# Patient Record
Sex: FEMALE | Race: WHITE | Employment: UNEMPLOYED | URBAN - NONMETROPOLITAN AREA
[De-identification: names, ages, dates, MRNs, and addresses within clinical notes are randomized per-mention and may not be internally consistent; named-entity substitution may affect disease eponyms.]

---

## 2018-08-18 ENCOUNTER — HOSPITAL ENCOUNTER (INPATIENT)
Facility: OTHER | Age: 63
LOS: 3 days | Discharge: HOME | DRG: 809 | End: 2018-08-21
Attending: FAMILY MEDICINE | Admitting: INTERNAL MEDICINE
Payer: COMMERCIAL

## 2018-08-18 ENCOUNTER — APPOINTMENT (OUTPATIENT)
Dept: GENERAL RADIOLOGY | Facility: OTHER | Age: 63
DRG: 809 | End: 2018-08-18
Attending: FAMILY MEDICINE
Payer: COMMERCIAL

## 2018-08-18 DIAGNOSIS — C50.919 METASTASIS FROM BREAST CANCER (H): ICD-10-CM

## 2018-08-18 DIAGNOSIS — J90 PLEURAL EFFUSION: ICD-10-CM

## 2018-08-18 DIAGNOSIS — C79.9 METASTASIS FROM BREAST CANCER (H): ICD-10-CM

## 2018-08-18 DIAGNOSIS — R50.9 FEBRILE ILLNESS, ACUTE: ICD-10-CM

## 2018-08-18 LAB
ALBUMIN SERPL-MCNC: 3.7 G/DL (ref 3.5–5.7)
ALBUMIN UR-MCNC: NEGATIVE MG/DL
ALP SERPL-CCNC: 87 U/L (ref 34–104)
ALT SERPL W P-5'-P-CCNC: 24 U/L (ref 7–52)
ANION GAP SERPL CALCULATED.3IONS-SCNC: 7 MMOL/L (ref 3–14)
APPEARANCE UR: CLEAR
AST SERPL W P-5'-P-CCNC: 39 U/L (ref 13–39)
BASOPHILS # BLD AUTO: 0 10E9/L (ref 0–0.2)
BASOPHILS NFR BLD AUTO: 1 %
BILIRUB SERPL-MCNC: 0.4 MG/DL (ref 0.3–1)
BILIRUB UR QL STRIP: NEGATIVE
BUN SERPL-MCNC: 7 MG/DL (ref 7–25)
CALCIUM SERPL-MCNC: 8.9 MG/DL (ref 8.6–10.3)
CHLORIDE SERPL-SCNC: 101 MMOL/L (ref 98–107)
CO2 SERPL-SCNC: 26 MMOL/L (ref 21–31)
COLOR UR AUTO: YELLOW
CREAT SERPL-MCNC: 0.61 MG/DL (ref 0.6–1.2)
CRP SERPL-MCNC: 1.9 MG/L
DIFFERENTIAL METHOD BLD: ABNORMAL
DIFFERENTIAL METHOD BLD: NORMAL
ERYTHROCYTE [DISTWIDTH] IN BLOOD BY AUTOMATED COUNT: 13.6 % (ref 10–15)
ERYTHROCYTE [DISTWIDTH] IN BLOOD BY AUTOMATED COUNT: NORMAL % (ref 10–15)
GFR SERPL CREATININE-BSD FRML MDRD: >90 ML/MIN/1.7M2
GLUCOSE SERPL-MCNC: 98 MG/DL (ref 70–105)
GLUCOSE UR STRIP-MCNC: NEGATIVE MG/DL
HCT VFR BLD AUTO: 34.5 % (ref 35–47)
HCT VFR BLD AUTO: NORMAL % (ref 35–47)
HGB BLD-MCNC: 12.5 G/DL (ref 11.7–15.7)
HGB BLD-MCNC: NORMAL G/DL (ref 11.7–15.7)
HGB UR QL STRIP: NEGATIVE
KETONES UR STRIP-MCNC: NEGATIVE MG/DL
LACTATE SERPL-SCNC: 0.9 MMOL/L (ref 0.5–2.2)
LEUKOCYTE ESTERASE UR QL STRIP: NEGATIVE
LYMPHOCYTES # BLD AUTO: 0.5 10E9/L (ref 0.8–5.3)
LYMPHOCYTES NFR BLD AUTO: 36 %
MCH RBC QN AUTO: 34.9 PG (ref 26.5–33)
MCH RBC QN AUTO: NORMAL PG (ref 26.5–33)
MCHC RBC AUTO-ENTMCNC: 36.2 G/DL (ref 31.5–36.5)
MCHC RBC AUTO-ENTMCNC: NORMAL G/DL (ref 31.5–36.5)
MCV RBC AUTO: 96 FL (ref 78–100)
MCV RBC AUTO: NORMAL FL (ref 78–100)
MONOCYTES # BLD AUTO: 0.2 10E9/L (ref 0–1.3)
MONOCYTES NFR BLD AUTO: 11 %
NEUTROPHILS # BLD AUTO: 0.7 10E9/L (ref 1.6–8.3)
NEUTROPHILS NFR BLD AUTO: 52 %
NITRATE UR QL: NEGATIVE
NRBC # BLD AUTO: 0 10*3/UL
NRBC BLD AUTO-RTO: 1 /100
PH UR STRIP: 5.5 PH (ref 5–9)
PLATELET # BLD AUTO: 148 10E9/L (ref 150–450)
PLATELET # BLD AUTO: NORMAL 10E9/L (ref 150–450)
POTASSIUM SERPL-SCNC: 3.5 MMOL/L (ref 3.5–5.1)
PROCALCITONIN SERPL-MCNC: 0.5 NG/ML
PROT SERPL-MCNC: 6.7 G/DL (ref 6.4–8.9)
RBC # BLD AUTO: 3.58 10E12/L (ref 3.8–5.2)
RBC # BLD AUTO: NORMAL 10E12/L (ref 3.8–5.2)
SODIUM SERPL-SCNC: 134 MMOL/L (ref 134–144)
SOURCE: NORMAL
SP GR UR STRIP: <1.005 (ref 1–1.03)
UROBILINOGEN UR STRIP-ACNC: 0.2 EU/DL (ref 0.2–1)
WBC # BLD AUTO: 1.4 10E9/L (ref 4–11)
WBC # BLD AUTO: NORMAL 10E9/L (ref 4–11)

## 2018-08-18 PROCEDURE — 85025 COMPLETE CBC W/AUTO DIFF WBC: CPT | Performed by: FAMILY MEDICINE

## 2018-08-18 PROCEDURE — 96365 THER/PROPH/DIAG IV INF INIT: CPT | Performed by: FAMILY MEDICINE

## 2018-08-18 PROCEDURE — 80053 COMPREHEN METABOLIC PANEL: CPT | Performed by: FAMILY MEDICINE

## 2018-08-18 PROCEDURE — 99285 EMERGENCY DEPT VISIT HI MDM: CPT | Mod: Z6 | Performed by: FAMILY MEDICINE

## 2018-08-18 PROCEDURE — 71046 X-RAY EXAM CHEST 2 VIEWS: CPT

## 2018-08-18 PROCEDURE — 87040 BLOOD CULTURE FOR BACTERIA: CPT | Performed by: FAMILY MEDICINE

## 2018-08-18 PROCEDURE — 86140 C-REACTIVE PROTEIN: CPT | Performed by: FAMILY MEDICINE

## 2018-08-18 PROCEDURE — 83605 ASSAY OF LACTIC ACID: CPT | Performed by: FAMILY MEDICINE

## 2018-08-18 PROCEDURE — 36415 COLL VENOUS BLD VENIPUNCTURE: CPT | Performed by: FAMILY MEDICINE

## 2018-08-18 PROCEDURE — 84145 PROCALCITONIN (PCT): CPT | Performed by: FAMILY MEDICINE

## 2018-08-18 PROCEDURE — 99285 EMERGENCY DEPT VISIT HI MDM: CPT | Mod: 25 | Performed by: FAMILY MEDICINE

## 2018-08-18 PROCEDURE — 25000128 H RX IP 250 OP 636: Performed by: FAMILY MEDICINE

## 2018-08-18 PROCEDURE — 81003 URINALYSIS AUTO W/O SCOPE: CPT | Performed by: FAMILY MEDICINE

## 2018-08-18 PROCEDURE — 87040 BLOOD CULTURE FOR BACTERIA: CPT | Mod: 91 | Performed by: FAMILY MEDICINE

## 2018-08-18 PROCEDURE — 12000000 ZZH R&B MED SURG/OB

## 2018-08-18 PROCEDURE — 96361 HYDRATE IV INFUSION ADD-ON: CPT | Performed by: FAMILY MEDICINE

## 2018-08-18 RX ORDER — CEFEPIME HYDROCHLORIDE 2 G/1
2 INJECTION, POWDER, FOR SOLUTION INTRAVENOUS ONCE
Status: COMPLETED | OUTPATIENT
Start: 2018-08-18 | End: 2018-08-18

## 2018-08-18 RX ORDER — CEFEPIME HYDROCHLORIDE 2 G/1
2 INJECTION, POWDER, FOR SOLUTION INTRAVENOUS EVERY 8 HOURS
Status: DISCONTINUED | OUTPATIENT
Start: 2018-08-18 | End: 2018-08-19 | Stop reason: CLARIF

## 2018-08-18 RX ORDER — NALOXONE HYDROCHLORIDE 0.4 MG/ML
.1-.4 INJECTION, SOLUTION INTRAMUSCULAR; INTRAVENOUS; SUBCUTANEOUS
Status: DISCONTINUED | OUTPATIENT
Start: 2018-08-18 | End: 2018-08-21 | Stop reason: HOSPADM

## 2018-08-18 RX ORDER — EXEMESTANE 25 MG/1
25 TABLET ORAL DAILY
COMMUNITY

## 2018-08-18 RX ORDER — HYDROCODONE BITARTRATE AND ACETAMINOPHEN 5; 325 MG/1; MG/1
1-2 TABLET ORAL EVERY 4 HOURS PRN
Status: DISCONTINUED | OUTPATIENT
Start: 2018-08-18 | End: 2018-08-21 | Stop reason: HOSPADM

## 2018-08-18 RX ORDER — SODIUM CHLORIDE, SODIUM LACTATE, POTASSIUM CHLORIDE, CALCIUM CHLORIDE 600; 310; 30; 20 MG/100ML; MG/100ML; MG/100ML; MG/100ML
1000 INJECTION, SOLUTION INTRAVENOUS CONTINUOUS
Status: DISCONTINUED | OUTPATIENT
Start: 2018-08-18 | End: 2018-08-19

## 2018-08-18 RX ORDER — ACETAMINOPHEN 325 MG/1
650 TABLET ORAL EVERY 4 HOURS PRN
Status: DISCONTINUED | OUTPATIENT
Start: 2018-08-18 | End: 2018-08-21 | Stop reason: HOSPADM

## 2018-08-18 RX ORDER — ONDANSETRON 4 MG/1
4 TABLET, ORALLY DISINTEGRATING ORAL EVERY 6 HOURS PRN
Status: DISCONTINUED | OUTPATIENT
Start: 2018-08-18 | End: 2018-08-21 | Stop reason: HOSPADM

## 2018-08-18 RX ORDER — ONDANSETRON 2 MG/ML
4 INJECTION INTRAMUSCULAR; INTRAVENOUS EVERY 6 HOURS PRN
Status: DISCONTINUED | OUTPATIENT
Start: 2018-08-18 | End: 2018-08-21 | Stop reason: HOSPADM

## 2018-08-18 RX ADMIN — SODIUM CHLORIDE, SODIUM LACTATE, POTASSIUM CHLORIDE, AND CALCIUM CHLORIDE 1000 ML: 600; 310; 30; 20 INJECTION, SOLUTION INTRAVENOUS at 20:51

## 2018-08-18 RX ADMIN — CEFEPIME HYDROCHLORIDE 2 G: 2 INJECTION, POWDER, FOR SOLUTION INTRAVENOUS at 21:25

## 2018-08-18 NOTE — IP AVS SNAPSHOT
[facility]    [address]    [place] [address]    Phone:  [phone]    Fax:  [phone]                                       After Visit Summary   8/18/2018    Barb Valdez    MRN: [id number]           After Visit Summary Signature Page     I have received my discharge instructions, and my questions have been answered. I have discussed any challenges I see with this plan with the nurse or doctor.    ..........................................................................................................................................  Patient/Patient Representative Signature      ..........................................................................................................................................  Patient Representative Print Name and Relationship to Patient    ..................................................               ................................................  Date                                            Time    ..........................................................................................................................................  Reviewed by Signature/Title    ...................................................              ..............................................  Date                                                            Time

## 2018-08-18 NOTE — IP AVS SNAPSHOT
MRN:[id number]                      After Visit Summary   8/18/2018    Barb Valdez    MRN: [id number]           Thank you!     Thank you for choosing [facility] for your care. Our goal is always to provide you with excellent care. Hearing back from our patients is one way we can continue to improve our services. Please take a few minutes to complete the written survey that you may receive in the mail after you visit with us. Thank you!        Patient Information     Date Of Birth          1955        Designated Caregiver       Most Recent Value    Caregiver    Will someone help with your care after discharge? no      About your hospital stay     You were admitted on:  August 18, 2018 You last received care in the:  [facility]    You were discharged on:  August 21, 2018        Reason for your hospital stay       Fever with low neutrophils (white cells) in your blood.                  Who to Call     For medical emergencies, please call 911.  For non-urgent questions about your medical care, please call your primary care provider or clinic, None          Attending Provider     Provider Specialty    Catrachito Lilly MD Specialist    Sander Wren MD Internal Medicine       Primary Care Provider Fax #    Physician No Ref-Primary [phone]      After Care Instructions     Activity       Your activity upon discharge: activity as tolerated            Diet       Follow this diet upon discharge: Orders Placed This Encounter      Regular Diet Adult                  Follow-up Appointments     Follow-up and recommended labs and tests        Follow up with your oncologist in [place] on 8/24/2018  for hospital follow- up. The following labs/tests are recommended: CBC with diff. Follow up of stool culture done in-house.                  Pending Results     Date and Time Order Name Status Description    8/19/2018 1035 Stool culture SSCE Preliminary     8/18/2018 2020 Blood  "culture Preliminary     2018 Blood culture Preliminary             Statement of Approval     Ordered          18 1012  I have reviewed and agree with all the recommendations and orders detailed in this document.  EFFECTIVE NOW     Approved and electronically signed by:  Sander Wren MD             Admission Information     Date & Time Provider Department Dept. Phone    2018 Sander Wren MD [facility] [phone]      Your Vitals Were     Blood Pressure Pulse Temperature Respirations Height Weight    111/63 69 97.6  F (36.4  C) (Tympanic) 16 1.626 m (5' 4\") 57.4 kg (126 lb 9.6 oz)    Pulse Oximetry BMI (Body Mass Index)                95% 21.73 kg/m2          MyChart Information     [employer] lets you send messages to your doctor, view your test results, renew your prescriptions, schedule appointments and more. To sign up, go to [email or web address] . Click on \"Log in\" on the left side of the screen, which will take you to the Welcome page. Then click on \"Sign up Now\" on the right side of the page.     You will be asked to enter the access code listed below, as well as some personal information. Please follow the directions to create your username and password.     Your access code is: [email or web address]  Expires: 2018 10:22 AM     Your access code will  in 90 days. If you need help or a new code, please call your [facility] clinic or [phone].        Care EveryWhere ID     This is your Care EveryWhere ID. This could be used by other organizations to access your [facility] medical records  [id number]        Equal Access to Services     [facility] AH: Hadii torey Wang, waorlando bar, qaybron colindres. So [facility] [phone].    ATENCIÓN: Si habla español, tiene a ly disposición servicios gratuitos de asistencia lingüística. Llame al [phone].    We comply with applicable federal civil rights " laws and [place] laws. We do not discriminate on the basis of race, color, national origin, age, disability, sex, sexual orientation, or gender identity.               Review of your medicines      CONTINUE these medicines which have NOT CHANGED        Dose / Directions    exemestane 25 MG tablet   Commonly known as:  AROMASIN        Dose:  25 mg   Take 25 mg by mouth daily   Refills:  0       Multi-vitamin Tabs tablet        Dose:  1 tablet   Take 1 tablet by mouth daily   Refills:  0       PROBIOTIC ADVANCED PO        Dose:  1 capsule   Take 1 capsule by mouth daily [employer]   Refills:  0       Vitamin D (Cholecalciferol) 1000 units Tabs        Dose:  5000 Units   Take 5,000 Units by mouth daily   Refills:  0         STOP taking     palbociclib 100 MG capsule CHEMO   Commonly known as:  IBRANCE                    Protect others around you: Learn how to safely use, store and throw away your medicines at www.disposemymeds.org.             Medication List: This is a list of all your medications and when to take them. Check marks below indicate your daily home schedule. Keep this list as a reference.      Medications           Morning Afternoon Evening Bedtime As Needed    exemestane 25 MG tablet   Commonly known as:  AROMASIN   Take 25 mg by mouth daily   Last time this was given:  25 mg on 8/21/2018 10:00 AM                                Multi-vitamin Tabs tablet   Take 1 tablet by mouth daily   Last time this was given:  1 tablet on 8/20/2018 11:31 AM                                PROBIOTIC ADVANCED PO   Take 1 capsule by mouth daily Geni                                Vitamin D (Cholecalciferol) 1000 units Tabs   Take 5,000 Units by mouth daily   Last time this was given:  5,000 Units on 8/20/2018 11:31 AM

## 2018-08-19 LAB
ANION GAP SERPL CALCULATED.3IONS-SCNC: 5 MMOL/L (ref 3–14)
BASOPHILS # BLD AUTO: 0 10E9/L (ref 0–0.2)
BASOPHILS NFR BLD AUTO: 0 %
BUN SERPL-MCNC: 7 MG/DL (ref 7–25)
C DIFF TOX B STL QL: NEGATIVE
CALCIUM SERPL-MCNC: 8.7 MG/DL (ref 8.6–10.3)
CHLORIDE SERPL-SCNC: 107 MMOL/L (ref 98–107)
CO2 SERPL-SCNC: 24 MMOL/L (ref 21–31)
CREAT SERPL-MCNC: 0.58 MG/DL (ref 0.6–1.2)
DIFFERENTIAL METHOD BLD: ABNORMAL
EOSINOPHIL # BLD AUTO: 0 10E9/L (ref 0–0.7)
EOSINOPHIL NFR BLD AUTO: 0 %
ERYTHROCYTE [DISTWIDTH] IN BLOOD BY AUTOMATED COUNT: 13.7 % (ref 10–15)
GFR SERPL CREATININE-BSD FRML MDRD: >90 ML/MIN/1.7M2
GLUCOSE SERPL-MCNC: 88 MG/DL (ref 70–105)
HCT VFR BLD AUTO: 32 % (ref 35–47)
HGB BLD-MCNC: 11.7 G/DL (ref 11.7–15.7)
LYMPHOCYTES # BLD AUTO: 0.7 10E9/L (ref 0.8–5.3)
LYMPHOCYTES NFR BLD AUTO: 42 %
MCH RBC QN AUTO: 35.1 PG (ref 26.5–33)
MCHC RBC AUTO-ENTMCNC: 36.6 G/DL (ref 31.5–36.5)
MCV RBC AUTO: 96 FL (ref 78–100)
MONOCYTES # BLD AUTO: 0.2 10E9/L (ref 0–1.3)
MONOCYTES NFR BLD AUTO: 14 %
NEUTROPHILS # BLD AUTO: 0.4 10E9/L (ref 1.6–8.3)
NEUTROPHILS NFR BLD AUTO: 28 %
NEUTS BAND # BLD AUTO: 0.3 10E9/L (ref 0–0.6)
NEUTS BAND NFR BLD MANUAL: 16 %
PLATELET # BLD AUTO: 114 10E9/L (ref 150–450)
POTASSIUM SERPL-SCNC: 3.6 MMOL/L (ref 3.5–5.1)
RBC # BLD AUTO: 3.33 10E12/L (ref 3.8–5.2)
SODIUM SERPL-SCNC: 136 MMOL/L (ref 134–144)
SPECIMEN SOURCE: NORMAL
VARIANT LYMPHS BLD QL SMEAR: PRESENT
WBC # BLD AUTO: 1.6 10E9/L (ref 4–11)

## 2018-08-19 PROCEDURE — 25000132 ZZH RX MED GY IP 250 OP 250 PS 637: Performed by: INTERNAL MEDICINE

## 2018-08-19 PROCEDURE — 36415 COLL VENOUS BLD VENIPUNCTURE: CPT | Mod: 91 | Performed by: FAMILY MEDICINE

## 2018-08-19 PROCEDURE — 87493 C DIFF AMPLIFIED PROBE: CPT | Performed by: INTERNAL MEDICINE

## 2018-08-19 PROCEDURE — 12000000 ZZH R&B MED SURG/OB

## 2018-08-19 PROCEDURE — 80048 BASIC METABOLIC PNL TOTAL CA: CPT | Performed by: FAMILY MEDICINE

## 2018-08-19 PROCEDURE — 87046 STOOL CULTR AEROBIC BACT EA: CPT | Performed by: INTERNAL MEDICINE

## 2018-08-19 PROCEDURE — 36415 COLL VENOUS BLD VENIPUNCTURE: CPT | Performed by: FAMILY MEDICINE

## 2018-08-19 PROCEDURE — 87899 AGENT NOS ASSAY W/OPTIC: CPT | Performed by: INTERNAL MEDICINE

## 2018-08-19 PROCEDURE — 25000128 H RX IP 250 OP 636: Performed by: FAMILY MEDICINE

## 2018-08-19 PROCEDURE — 25000128 H RX IP 250 OP 636: Performed by: INTERNAL MEDICINE

## 2018-08-19 PROCEDURE — 25000132 ZZH RX MED GY IP 250 OP 250 PS 637: Performed by: FAMILY MEDICINE

## 2018-08-19 PROCEDURE — 87045 FECES CULTURE AEROBIC BACT: CPT | Performed by: INTERNAL MEDICINE

## 2018-08-19 PROCEDURE — 99223 1ST HOSP IP/OBS HIGH 75: CPT | Performed by: INTERNAL MEDICINE

## 2018-08-19 PROCEDURE — 85025 COMPLETE CBC W/AUTO DIFF WBC: CPT | Performed by: FAMILY MEDICINE

## 2018-08-19 PROCEDURE — 85025 COMPLETE CBC W/AUTO DIFF WBC: CPT | Mod: 91 | Performed by: FAMILY MEDICINE

## 2018-08-19 RX ORDER — LACTOBACILLUS ACIDOPH-L.BULGARICUS 1 MILLION CELL CHEWABLE TABLET 1MM CELL
1 TABLET,CHEWABLE ORAL
Status: DISCONTINUED | OUTPATIENT
Start: 2018-08-19 | End: 2018-08-21 | Stop reason: HOSPADM

## 2018-08-19 RX ORDER — MULTIPLE VITAMINS W/ MINERALS TAB 9MG-400MCG
1 TAB ORAL
Status: DISCONTINUED | OUTPATIENT
Start: 2018-08-19 | End: 2018-08-21 | Stop reason: HOSPADM

## 2018-08-19 RX ORDER — MULTIPLE VITAMINS W/ MINERALS TAB 9MG-400MCG
1 TAB ORAL DAILY
COMMUNITY

## 2018-08-19 RX ORDER — MULTIVIT-MIN/IRON/FOLIC ACID/K 18-600-40
5000 CAPSULE ORAL DAILY
COMMUNITY

## 2018-08-19 RX ORDER — CEFEPIME 1 G/50ML
2 INJECTION, SOLUTION INTRAVENOUS EVERY 8 HOURS
Status: DISCONTINUED | OUTPATIENT
Start: 2018-08-19 | End: 2018-08-19

## 2018-08-19 RX ORDER — EXEMESTANE 25 MG/1
25 TABLET ORAL DAILY
Status: DISCONTINUED | OUTPATIENT
Start: 2018-08-19 | End: 2018-08-19

## 2018-08-19 RX ORDER — SODIUM CHLORIDE 9 MG/ML
INJECTION, SOLUTION INTRAVENOUS CONTINUOUS
Status: DISCONTINUED | OUTPATIENT
Start: 2018-08-19 | End: 2018-08-20

## 2018-08-19 RX ORDER — LACTOBACILLUS ACIDOPH-L.BULGARICUS 1 MILLION CELL CHEWABLE TABLET 1MM CELL
1 TABLET,CHEWABLE ORAL
Status: DISCONTINUED | OUTPATIENT
Start: 2018-08-19 | End: 2018-08-19 | Stop reason: CLARIF

## 2018-08-19 RX ADMIN — CEFEPIME HYDROCHLORIDE 2 G: 2 INJECTION, POWDER, FOR SOLUTION INTRAVENOUS at 05:50

## 2018-08-19 RX ADMIN — CEFEPIME HYDROCHLORIDE 2 G: 2 INJECTION, POWDER, FOR SOLUTION INTRAVENOUS at 21:07

## 2018-08-19 RX ADMIN — SODIUM CHLORIDE: 900 INJECTION, SOLUTION INTRAVENOUS at 01:04

## 2018-08-19 RX ADMIN — LACTOBACILLUS ACIDOPH-L.BULGARICUS 1 MILLION CELL CHEWABLE TABLET 1 TABLET: at 13:40

## 2018-08-19 RX ADMIN — LACTOBACILLUS ACIDOPH-L.BULGARICUS 1 MILLION CELL CHEWABLE TABLET 1 TABLET: at 16:50

## 2018-08-19 RX ADMIN — FILGRASTIM 300 MCG: 300 INJECTION, SOLUTION INTRAVENOUS; SUBCUTANEOUS at 09:22

## 2018-08-19 RX ADMIN — VITAMIN D 5000 UNITS: 25 TAB ORAL at 13:41

## 2018-08-19 RX ADMIN — CEFEPIME HYDROCHLORIDE 2 G: 2 INJECTION, POWDER, FOR SOLUTION INTRAVENOUS at 13:47

## 2018-08-19 RX ADMIN — MULTIPLE VITAMINS W/ MINERALS TAB 1 TABLET: TAB at 13:41

## 2018-08-19 RX ADMIN — SODIUM CHLORIDE: 900 INJECTION, SOLUTION INTRAVENOUS at 19:43

## 2018-08-19 RX ADMIN — ACETAMINOPHEN 650 MG: 325 TABLET, FILM COATED ORAL at 16:26

## 2018-08-19 ASSESSMENT — ACTIVITIES OF DAILY LIVING (ADL)
ADLS_ACUITY_SCORE: 9
ADLS_ACUITY_SCORE: 11

## 2018-08-19 NOTE — H&P
"[facility]    History and Physical  Hospitalist       Date of Admission:  8/18/2018  Date of Service (when I saw the patient): 08/19/18    Assessment & Plan   Barb Valdez is a 63 year old female who presents with febrile neutropenia.    1. Febrile neutropenia- . IV Cefepime. Source likely viral GE. Blood cultures need to ne negative for 48 hours. Given one dose of Neupogen. Follow counts and symptoms. ANC >500 x 2 days and no more antibiotics provided all cultures negative.   2. Metastatic breast CA- Follow up in [place]. Would feel more comfortable holding even hormonal therapy though that can be resumed in theory. No pain.     Active Problems:    Febrile illness, acute    DVT Prophylaxis: Enoxaparin (Lovenox) SQ  Code Status: Full Code    Disposition: Expected discharge in 2-3 days once counts fine, Cxs negative and pt doing okay.    Sander Wren    Primary Care Physician   Physician No Ref-Primary    Chief Complaint   Fever    History is obtained from the patient, electronic health record and emergency department physician    History of Present Illness   Per ED note: \"Barb Valdez is a 63 year old female who presents to emergency room with her  because of a illness that began a couple of days ago.  She feels as though she has \" picked up some type of  Bug\",  but yesterday was down in bed and in fact  hais spent about 24 hours in that fashion which is unusual for her as she is relatively active lady.  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  The patient had a recurrence about 1-1/2 years ago with metastasis determined and oncology has been following her for which she is currently on 2 medications for the management of her breast cancer, and they currently include, Aromasin and Ibrance.  However " "because of having a low white blood count she has held 1 of these medications, Ibrance,, but continues the other on a daily fashion.  The patient has had complications from the metastatic disease, pleural effusions, and this required numerous thoracenteses but has not had one for several months now.  The pleural effusion is predominantly on the left side but the patient readily states that her chest x-ray looks very ominous.  The patient's most recent scan , a PET scan was determining that there was \"widespread osseous metastatic and persistent moderate volume changes with loculated left pleural effusion and left hemithorax pleural thickening but there was no overtly new or progressive signs of malignancy.  And that study was done in July of this year.  Patient advised until she developed the metastatic breast cancer that she had been reasonably good health. However had been having problems ,  like gastroesophageal reflux.  Patient however has had some general nausea and just not feeling very well in general\"    When I saw her she added that she's had 2 days of crampy abdominal pain and today has a low volume loose watery stool. She was recently at a pot luck and has been using public toilets in airplanes during her recent travels in the last 2 weeks. These could be potentially where she picked the virus up. No nausea or emesis. A chronic dry cough that is unchanged from her stable pleural effusion. No rashes, lesions, headaches, insect bites or any any other symptoms.     Past Medical History    Metastaic breast cancer    Past Surgical History   Past surgical history reviewed with no previous surgeries identified.    Prior to Admission Medications   Prior to Admission Medications   Prescriptions Last Dose Informant Patient Reported? Taking?   exemestane (AROMASIN) 25 MG tablet 8/18/2018 at Unknown time  Yes Yes   Sig: Take 25 mg by mouth daily   palbociclib (IBRANCE) 100 MG capsule CHEMO 7/29/2018  Yes Yes   Sig: " Take 100 mg by mouth daily with food      Facility-Administered Medications: None     Allergies   No Known Allergies    Social History   I have personally reviewed the social history with the patient showing no smoking or alcohol.    Family History   Family history reviewed with patient and is noncontributory.    Review of Systems   The 10 point Review of Systems is negative other than noted in the HPI or here.     Physical Exam   Temp: 100.2  F (37.9  C) Temp src: Tympanic BP: 113/73 Pulse: 84 Heart Rate: 81 Resp: 16 SpO2: 95 % O2 Device: None (Room air)    Vital Signs with Ranges  Temp:  [98.8  F (37.1  C)-101.3  F (38.5  C)] 100.2  F (37.9  C)  Pulse:  [84-91] 84  Heart Rate:  [81-91] 81  Resp:  [14-16] 16  BP: (107-128)/(63-86) 113/73  SpO2:  [95 %-98 %] 95 %  123 lbs 0 oz    Constitutional: In NAD  Eyes: Unremarkable.  HEENT: Dry MM  Respiratory: Left sided dimnished BS at base. R lung clear  Cardiovascular: S1 + S2.  GI: Non-tender. Hyperactive BS.   Skin: No acute rashes/lesions.   Musculoskeletal: No pitting edema.   Neurologic: Grossly non-focal.  Psychiatric: Affect normal.     Data   Data reviewed today:  I personally reviewed all data below:    Recent Labs  Lab 08/19/18  0545 08/18/18 2051 08/18/18 2021   WBC 1.6* 1.4* Canceled, Test credited   HGB 11.7 12.5 Canceled, Test credited   MCV 96 96 Canceled, Test credited   * 148* Canceled, Test credited     --  134   POTASSIUM 3.6  --  3.5   CHLORIDE 107  --  101   CO2 24  --  26   BUN 7  --  7   CR 0.58*  --  0.61   ANIONGAP 5  --  7   SUSANA 8.7  --  8.9   GLC 88  --  98   ALBUMIN  --   --  3.7   PROTTOTAL  --   --  6.7   BILITOTAL  --   --  0.4   ALKPHOS  --   --  87   ALT  --   --  24   AST  --   --  39       Lactic Acid   Date Value Ref Range Status   08/18/2018 0.9 0.5 - 2.2 mmol/L Final       Recent Results (from the past 24 hour(s))   XR Chest 2 Views    Narrative    PROCEDURE:  XR CHEST 2 VW    HISTORY:  Fever; .     COMPARISON:   None.    FINDINGS:   The cardiac silhouette is normal in size. The pulmonary vasculature is  normal.  There is some increased density at the left lung base most  likely representing compressive atelectasis. There is a moderate size  left-sided pleural effusion. Widespread osteoblastic metastatic  disease is seen throughout the regional skeleton      Impression    IMPRESSION:    1. Widespread osteoblastic metastatic disease.  2. Left-sided pleural effusion.  3. Probable compressive atelectasis left lung base      SIL CHAPARRO MD

## 2018-08-19 NOTE — PROGRESS NOTES
"[facility] ADMISSION NOTE    Patient admitted to room 312 at approximately 2250 via cart from emergency room. Patient was accompanied by spouse and transport tech.     Verbal SBAR report received from LAUREN Soto prior to patient arrival.     Patient ambulated to bed with stand-by assist. Patient alert and oriented X 3. The patient is not having any pain. 0-10 Pain Scale: 0. Admission vital signs: Blood pressure 128/76, pulse 85, temperature 101.3  F (38.5  C), temperature source Tympanic, resp. rate 14, height 1.626 m (5' 4\"), weight 55.8 kg (123 lb), SpO2 98 %. Patient and spouse were oriented to plan of care, call light, bed controls, tv, telephone, bathroom and visiting hours.       "

## 2018-08-19 NOTE — ED TRIAGE NOTES
Patient states that she has stage 4 breast cancer which she is on oral chemotherapy for which she last took on 7/29. She has had a fever for 24 hours. States she has had cramping, loss of appetite. States that on Monday her white blood cell count was 2.2.

## 2018-08-19 NOTE — PROGRESS NOTES
MD notified of critical value of absolute neutrophil count of 0.4. No new orders received at this time.

## 2018-08-19 NOTE — PLAN OF CARE
Problem: Patient Care Overview  Goal: Plan of Care/Patient Progress Review  Outcome: Improving  Patient was febrile upon admission with a temperature of 101.3. All other VSS. Patient declined PRN acetaminophen and cool wash cloth. Temp is decreased to 98.8 this a.m. Patient is resting without difficulty.

## 2018-08-19 NOTE — ED PROVIDER NOTES
"  History   No chief complaint on file.    HPI  Barb Valdez is a 63 year old female who presents to emergency room with her  because of a illness that began a couple of days ago.  She feels as though she has \" picked up some type of  Bug\",  but yesterday was down in bed and in fact  hais spent about 24 hours in that fashion which is unusual for her as she is relatively active lady.  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  The patient had a recurrence about 1-1/2 years ago with metastasis determined and oncology has been following her for which she is currently on 2 medications for the management of her breast cancer, and they currently include, Aromasin and Ibrance.  However because of having a low white blood count she has held 1 of these medications, Ibrance,, but continues the other on a daily fashion.  The patient has had complications from the metastatic disease, pleural effusions, and this required numerous thoracenteses but has not had one for several months now.  The pleural effusion is predominantly on the left side but the patient readily states that her chest x-ray looks very ominous.  The patient's most recent scan , a PET scan was determining that there was \"widespread osseous metastatic and persistent moderate volume changes with loculated left pleural effusion and left hemithorax pleural thickening but there was no overtly new or progressive signs of malignancy.  And that study was done in July of this year.  Patient advised until she developed the metastatic breast cancer that she had been reasonably good health. However had been having problems ,  like gastroesophageal reflux.  Patient however has had some general nausea and just not feeling very well in general    Problem List:    There are no active problems to display " "for this patient.  This patient has had gastroesophageal reflux problems and possibly asthma in the past but not on any medications no surgery other than the breast biopsies which actually    Past Medical History:    No past medical history on file.    Past Surgical History:    No past surgical history on file.    Family History:    No family history on file.    Social History:  Marital Status:  Single [1]  Social History   Substance Use Topics     Smoking status: Not on file     Smokeless tobacco: Not on file     Alcohol use Not on file      Has been  for 26 years and she has had 2 children of adult children one going off to college and has no grandchildren, she has never been a smoker, and very rarely uses alcohol deep partial thickness burn involving  Medications:      exemestane (AROMASIN) 25 MG tablet   palbociclib (IBRANCE) 100 MG capsule CHEMO         Review of Systems patient has had some nausea but no vomiting she has not had diarrhea.  She has not had a marked shortness of breath but has had slight increased cough which her  states also.  Temperature elevations her last day or 2 been up to 101 and has dropped and then got back up to 101 again today.  There is been no GI or  symptoms other than the mild stomach upset.    Physical Exam   BP: 112/86  Pulse: 91  Temp: 101.2  F (38.4  C)  Resp: 16  Height: 162.6 cm (5' 4\")  Weight: 55.8 kg (123 lb)  SpO2: 97 %      Physical Exam this patient is alert and oriented and mildly febrile  HEENT: ENT findings are unremarkable, eye exam is unremarkable  Neck: Supple no adenopathy  Chest: Clear to auscultation  Cardiovascular: Regular rate in the 90s but no murmur  Abdomen: Soft nontender no masses appreciated  Lower extremities are unremarkable no edema no inflammation no joint swelling or rashes  Orthopedic and neurological exam appears to be intact without any lateralizing findings or weaknesses    ED Course     ED Course     This patient was started " on antibiotics here in the emergency room after reviewing this presentation with her oncology provider in [place] prescription.  There recommendation was.  Cefepime and this will be given 2 g IV every 8 hours with placing the patient on the medical floor.    Procedures               Critical Care time:  none               Results for orders placed or performed during the hospital encounter of 08/18/18 (from the past 24 hour(s))   CBC with platelets differential   Result Value Ref Range    WBC Canceled, Test credited 4.0 - 11.0 10e9/L    RBC Count Canceled, Test credited 3.8 - 5.2 10e12/L    Hemoglobin Canceled, Test credited 11.7 - 15.7 g/dL    Hematocrit Canceled, Test credited 35.0 - 47.0 %    MCV Canceled, Test credited 78 - 100 fl    MCH Canceled, Test credited 26.5 - 33.0 pg    MCHC Canceled, Test credited 31.5 - 36.5 g/dL    RDW Canceled, Test credited 10.0 - 15.0 %    Platelet Count Canceled, Test credited 150 - 450 10e9/L    Diff Method Canceled, Test credited    Lactic acid   Result Value Ref Range    Lactic Acid 0.9 0.5 - 2.2 mmol/L   *UA reflex to Microscopic   Result Value Ref Range    Color Urine Yellow     Appearance Urine Clear     Glucose Urine Negative NEG^Negative mg/dL    Bilirubin Urine Negative NEG^Negative    Ketones Urine Negative NEG^Negative mg/dL    Specific Gravity Urine <1.005 1.000 - 1.030    Blood Urine Negative NEG^Negative    pH Urine 5.5 5.0 - 9.0 pH    Protein Albumin Urine Negative NEG^Negative mg/dL    Urobilinogen Urine 0.2 0.2 - 1.0 EU/dL    Nitrite Urine Negative NEG^Negative    Leukocyte Esterase Urine Negative NEG^Negative    Source Midstream Urine    XR Chest 2 Views    Narrative    PROCEDURE:  XR CHEST 2 VW    HISTORY:  Fever; .     COMPARISON:  None.    FINDINGS:   The cardiac silhouette is normal in size. The pulmonary vasculature is  normal.  There is some increased density at the left lung base most  likely representing compressive atelectasis. There is a moderate  size  left-sided pleural effusion. Widespread osteoblastic metastatic  disease is seen throughout the regional skeleton      Impression    IMPRESSION:    1. Widespread osteoblastic metastatic disease.  2. Left-sided pleural effusion.  3. Probable compressive atelectasis left lung base      SIL CHAPARRO MD   CBC with platelets differential   Result Value Ref Range    WBC 1.4 (L) 4.0 - 11.0 10e9/L    RBC Count 3.58 (L) 3.8 - 5.2 10e12/L    Hemoglobin 12.5 11.7 - 15.7 g/dL    Hematocrit 34.5 (L) 35.0 - 47.0 %    MCV 96 78 - 100 fl    MCH 34.9 (H) 26.5 - 33.0 pg    MCHC 36.2 31.5 - 36.5 g/dL    RDW 13.6 10.0 - 15.0 %    Platelet Count 148 (L) 150 - 450 10e9/L    Diff Method Automated Method        Medications   lactated ringers BOLUS 1,000 mL (1,000 mLs Intravenous New Bag 8/18/18 2051)     Followed by   lactated ringers infusion (not administered)       Assessments & Plan (with Medical Decision Making)     I have reviewed the nursing notes.    I have reviewed the findings, diagnosis, plan and need for follow up with the patient.   This patient with a possible sepsis and as in an oncology patient with fever will be admitted to this hospital and under the care of our hospitalist.  Patient was reviewed with the oncologist Dr. Mera oncologist on call for the patient's oncology service and he recommended admission to the hospital with initiation of IV antibiotics in the form of cefepime.  Patient given 2 g IV and will continue that every 8 hours until follow-up in the morning with the primary care provider or hospitalist.  I did advise the patient that consideration was also given to transfer to a tertiary Medical Center which was a consideration raised by the on-call hospitalist.  The patient would prefer staying here tonight and if necessary and is not improving that arrangement for transfer tomorrow morning could be accomplished.    New Prescriptions    No medications on file       Final diagnoses:   None        8/18/2018   [facility]     Catrachito Lilly MD  08/18/18 2153

## 2018-08-19 NOTE — PHARMACY-ADMISSION MEDICATION HISTORY
Pharmacy -- Admission Medication Reconciliation    Prior to admission (PTA) medications were reviewed and the patient's PTA medication list was updated.    Sources Consulted: patient    The reliability of this Medication Reconciliation is: Reliability: Reliable    The following significant changes were made:  ADDED supplements    In addition, the patient's allergies were reviewed with the patient and updated as follows:   Allergies: Review of patient's allergies indicates no known allergies.    The pharmacist has reviewed with the patient that all personal medications should be removed from the building or locked in the belongings safe.  Patient shall only take medications ordered by the physician and administered by the nursing staff.       Medication barriers identified: none   Medication adherence concerns: none   Understanding of emergency medications: n/a    Baylee Almaguer [facility], 8/19/2018,  10:59 AM

## 2018-08-19 NOTE — PROGRESS NOTES
Neupogen ordered due to low neutrophils, given 0922.  Pt having loose stools, stool sample sent to lab for testing.  C-Diff returned negative.  Pt tolerating regular diet including bland foods; toast, soup and saltines.  Pt has been comfortable and no further complaints of pain.  Pt fever increased to 101 from 100.2 this morning.  Family asked for dulce maria consult.  [facility] was called a  will see pt today or tomorrow.  Luz Maria Ghosh RN.............................8/19/2018 2:19 PM

## 2018-08-20 LAB
BASOPHILS # BLD AUTO: 0 10E9/L (ref 0–0.2)
BASOPHILS NFR BLD AUTO: 0 %
DIFFERENTIAL METHOD BLD: ABNORMAL
EOSINOPHIL # BLD AUTO: 0 10E9/L (ref 0–0.7)
EOSINOPHIL NFR BLD AUTO: 0 %
ERYTHROCYTE [DISTWIDTH] IN BLOOD BY AUTOMATED COUNT: 13.9 % (ref 10–15)
HCT VFR BLD AUTO: 33.2 % (ref 35–47)
HGB BLD-MCNC: 11.7 G/DL (ref 11.7–15.7)
HYPOCHROMIA BLD QL: PRESENT
LYMPHOCYTES # BLD AUTO: 1.3 10E9/L (ref 0.8–5.3)
LYMPHOCYTES NFR BLD AUTO: 24 %
MCH RBC QN AUTO: 34.6 PG (ref 26.5–33)
MCHC RBC AUTO-ENTMCNC: 35.2 G/DL (ref 31.5–36.5)
MCV RBC AUTO: 98 FL (ref 78–100)
MONOCYTES # BLD AUTO: 0.4 10E9/L (ref 0–1.3)
MONOCYTES NFR BLD AUTO: 7 %
NEUTROPHILS # BLD AUTO: 3.1 10E9/L (ref 1.6–8.3)
NEUTROPHILS NFR BLD AUTO: 59 %
NEUTS BAND # BLD AUTO: 0.5 10E9/L (ref 0–0.6)
NEUTS BAND NFR BLD MANUAL: 10 %
PLATELET # BLD AUTO: 133 10E9/L (ref 150–450)
PLATELET # BLD EST: ABNORMAL 10*3/UL
RBC # BLD AUTO: 3.38 10E12/L (ref 3.8–5.2)
RBC MORPH BLD: ABNORMAL
WBC # BLD AUTO: 5.3 10E9/L (ref 4–11)

## 2018-08-20 PROCEDURE — 99232 SBSQ HOSP IP/OBS MODERATE 35: CPT | Performed by: INTERNAL MEDICINE

## 2018-08-20 PROCEDURE — 85025 COMPLETE CBC W/AUTO DIFF WBC: CPT | Performed by: INTERNAL MEDICINE

## 2018-08-20 PROCEDURE — 36415 COLL VENOUS BLD VENIPUNCTURE: CPT | Performed by: INTERNAL MEDICINE

## 2018-08-20 PROCEDURE — 25000128 H RX IP 250 OP 636: Performed by: INTERNAL MEDICINE

## 2018-08-20 PROCEDURE — 25000132 ZZH RX MED GY IP 250 OP 250 PS 637: Performed by: INTERNAL MEDICINE

## 2018-08-20 PROCEDURE — 12000000 ZZH R&B MED SURG/OB

## 2018-08-20 RX ORDER — DIPHENHYDRAMINE HYDROCHLORIDE 50 MG/ML
50 INJECTION INTRAMUSCULAR; INTRAVENOUS ONCE
Status: DISCONTINUED | OUTPATIENT
Start: 2018-08-20 | End: 2018-08-21 | Stop reason: HOSPADM

## 2018-08-20 RX ORDER — EXEMESTANE 25 MG/1
25 TABLET ORAL DAILY
Status: DISCONTINUED | OUTPATIENT
Start: 2018-08-20 | End: 2018-08-21 | Stop reason: HOSPADM

## 2018-08-20 RX ADMIN — LACTOBACILLUS ACIDOPH-L.BULGARICUS 1 MILLION CELL CHEWABLE TABLET 1 TABLET: at 17:39

## 2018-08-20 RX ADMIN — MULTIPLE VITAMINS W/ MINERALS TAB 1 TABLET: TAB at 11:31

## 2018-08-20 RX ADMIN — LACTOBACILLUS ACIDOPH-L.BULGARICUS 1 MILLION CELL CHEWABLE TABLET 1 TABLET: at 11:31

## 2018-08-20 RX ADMIN — SODIUM CHLORIDE: 900 INJECTION, SOLUTION INTRAVENOUS at 03:42

## 2018-08-20 RX ADMIN — CEFEPIME HYDROCHLORIDE 2 G: 2 INJECTION, POWDER, FOR SOLUTION INTRAVENOUS at 06:06

## 2018-08-20 RX ADMIN — VITAMIN D 5000 UNITS: 25 TAB ORAL at 11:31

## 2018-08-20 RX ADMIN — EXEMESTANE 25 MG: 25 TABLET ORAL at 09:55

## 2018-08-20 RX ADMIN — CEFEPIME HYDROCHLORIDE 2 G: 2 INJECTION, POWDER, FOR SOLUTION INTRAVENOUS at 14:44

## 2018-08-20 RX ADMIN — LACTOBACILLUS ACIDOPH-L.BULGARICUS 1 MILLION CELL CHEWABLE TABLET 1 TABLET: at 08:30

## 2018-08-20 RX ADMIN — CEFEPIME HYDROCHLORIDE 2 G: 2 INJECTION, POWDER, FOR SOLUTION INTRAVENOUS at 21:24

## 2018-08-20 ASSESSMENT — ACTIVITIES OF DAILY LIVING (ADL)
ADLS_ACUITY_SCORE: 9

## 2018-08-20 NOTE — PROGRESS NOTES
Pt experiencing rash on back bilateral plus right posterior thigh.  Pt states rash itches.  Doc was notified and ordered 50mg benadryl.  Pt refused medication stating she did not want it.  Doc also ordered Lovenox and pt denied that as well.  Pt was educated to risks of refusing medication and verbalized understanding.  Pt also shares she has had 2 loose stools with the most recent improving.  Patient up ambulating independently in hallway with a steady gate.    Luz Maria Ghosh RN.............................8/20/2018 2:35 PM

## 2018-08-20 NOTE — PROGRESS NOTES
[facility]    Hospitalist Progress Note      Assessment & Plan   Barb Valdez is a 63 year old female who was admitted on 8/18/2018 for febrile neutropenia.    1. Febrile neutropenia- . IV Cefepime. Source likely viral GE. No more diarrhea. C.diff negative. Stool cultures pending. No pain. Chronic dry cough- unchanged. Blood cultures need to ne negative for 48 hours. Given one dose of Neupogen in AM of 8/19. Follow counts and symptoms. ANC >500 x 2 days and no more antibiotics provided all cultures negative. Repeat WBC 5.3K with 3100 neutrophils.   2. Metastatic breast CA- Follow up in [place]. Continue hormonal therapy.   3. This was reported later in the day. The pt started itching her upper back mostly last night. She says she lies on her back and ever since the fever with diaphoresis, he back has been soaked. On exam scattered macular lesions with a few scabs where she's scratched herself. Skin is dry as well. Already getting better. Appears to be a minor heat rash- use hydrating lotion. Other possibility is an allergic reaction to Cefepime but that appears unlikely.   4. Viral GE- Resolved. Had a formed stool this afternoon.  5. Chronic dry cough- More or less at her baseline. She describes an allergic rhinitis with post-nasal drip at times though none are prominent at this time and didn't wish to try an anti-histamine.     DVT Prophylaxis: Enoxaparin (Lovenox) subcutaneous (pt is ambulatory and declined this).  Code Status: Full code    Disposition: Expected discharge in 1-2 days based on labs and clinical picture.    Jesicawar Holger    Interval History   Diarrhea resolved since yesterday afternoon. Feels a little weak but no specific complaints (continues to have a chronic cough)    -Data reviewed today: I reviewed all new labs and imaging results over the last 24 hours. I personally reviewed no images or EKG's today.    Physical Exam   Temp: 99.2  F (37.3  C) Temp src:  Tympanic BP: 106/63   Heart Rate: 88 Resp: 18 SpO2: 97 % O2 Device: None (Room air)    Vitals:    08/18/18 1938 08/20/18 0400   Weight: 55.8 kg (123 lb) 57.4 kg (126 lb 9.6 oz)     Vital Signs with Ranges  Temp:  [99.2  F (37.3  C)-101.2  F (38.4  C)] 99.2  F (37.3  C)  Heart Rate:  [75-88] 88  Resp:  [16-18] 18  BP: ()/(61-73) 106/63  SpO2:  [95 %-99 %] 97 %  I/O last 3 completed shifts:  In: 2884 [P.O.:780; I.V.:2104]  Out: 1700 [Urine:1700]    Peripheral IV 08/19/18 Left Hand (Active)   Site Assessment WDL 8/20/2018  3:43 AM   Line Status Infusing 8/20/2018  3:43 AM   Phlebitis Scale 0-->no symptoms 8/20/2018  3:43 AM   Infiltration Scale 0 8/20/2018  3:43 AM   Infiltration Site Treatment Method  None 8/19/2018  5:00 PM   Number of days:1     Constitutional: In NAD  Eyes: Unremarkable.  HEENT: MMM  Respiratory: Left sided dimnished BS at base. R lung clear  Cardiovascular: S1 + S2.  GI: Non-tender. Hyperactive BS.   Skin: No acute rashes/lesions.   Musculoskeletal: No pitting edema.   Neurologic: Grossly non-focal.  Psychiatric: Affect normal.     Medications     sodium chloride 125 mL/hr at 08/20/18 0605       ceFEPIme (MAXIPIME) IV  2 g Intravenous Q8H     cholecalciferol  5,000 Units Oral Daily with lunch     exemestane  25 mg Oral Daily     lactobacillus acidophilus & bulgar  1 tablet Oral TID AC     multivitamin, therapeutic with minerals  1 tablet Oral Daily with lunch       Data     Recent Labs  Lab 08/19/18  0545 08/18/18 2051 08/18/18 2021   WBC 1.6* 1.4* Canceled, Test credited   HGB 11.7 12.5 Canceled, Test credited   MCV 96 96 Canceled, Test credited   * 148* Canceled, Test credited     --  134   POTASSIUM 3.6  --  3.5   CHLORIDE 107  --  101   CO2 24  --  26   BUN 7  --  7   CR 0.58*  --  0.61   ANIONGAP 5  --  7   SUSANA 8.7  --  8.9   GLC 88  --  98   ALBUMIN  --   --  3.7   PROTTOTAL  --   --  6.7   BILITOTAL  --   --  0.4   ALKPHOS  --   --  87   ALT  --   --  24   AST  --   --   39       No results found for this or any previous visit (from the past 24 hour(s)).

## 2018-08-20 NOTE — PLAN OF CARE
Problem: Patient Care Overview  Goal: Plan of Care/Patient Progress Review  Outcome: No Change  Patient had a low grade fever throughout the night reaching 99.4. All other VSS. Patient denies pain and had no further loose stools during NOC shift.

## 2018-08-20 NOTE — PHARMACY-PHARMACOTHERAPY NOTE
"Pharmacy: Patient Own Medication Identification    The requirements of Policy 13-03 from the Pharmacy Policy Manual have been met and the patient will be allowed to use their own supply of: Aromasin 25mg tabs    Baylee Almaguer [facility] has verified the name, dose, route, and directions for each medication. The integrity has been assessed and deemed safe.     The product(s) have been labeled as being inspected by a pharmacist and the medication has been placed in the patient-specific bin in the medication room.    Nursing will remove medication at the appropriately scheduled times and document the administration on the MAR with the designation of \"patient own\".    Nursing to return medication back to patient at time of discharge.     Baylee Almaguer RPH ....................  8/20/2018   12:55 PM  "

## 2018-08-21 VITALS
TEMPERATURE: 97.6 F | WEIGHT: 126.6 LBS | BODY MASS INDEX: 21.61 KG/M2 | RESPIRATION RATE: 16 BRPM | HEIGHT: 64 IN | HEART RATE: 69 BPM | OXYGEN SATURATION: 95 % | DIASTOLIC BLOOD PRESSURE: 63 MMHG | SYSTOLIC BLOOD PRESSURE: 111 MMHG

## 2018-08-21 LAB
ANION GAP SERPL CALCULATED.3IONS-SCNC: 7 MMOL/L (ref 3–14)
BACTERIA SPEC CULT: NORMAL
BACTERIA SPEC CULT: NORMAL
BASOPHILS # BLD AUTO: 0 10E9/L (ref 0–0.2)
BASOPHILS NFR BLD AUTO: 0.8 %
BUN SERPL-MCNC: 8 MG/DL (ref 7–25)
CALCIUM SERPL-MCNC: 8.5 MG/DL (ref 8.6–10.3)
CHLORIDE SERPL-SCNC: 109 MMOL/L (ref 98–107)
CO2 SERPL-SCNC: 26 MMOL/L (ref 21–31)
CREAT SERPL-MCNC: 0.49 MG/DL (ref 0.6–1.2)
DIFFERENTIAL METHOD BLD: ABNORMAL
E COLI SXT1+2 STL IA: NORMAL
E COLI SXT1+2 STL IA: NORMAL
EOSINOPHIL # BLD AUTO: 0 10E9/L (ref 0–0.7)
EOSINOPHIL NFR BLD AUTO: 0.8 %
ERYTHROCYTE [DISTWIDTH] IN BLOOD BY AUTOMATED COUNT: 13.8 % (ref 10–15)
GFR SERPL CREATININE-BSD FRML MDRD: >90 ML/MIN/1.7M2
GLUCOSE SERPL-MCNC: 86 MG/DL (ref 70–105)
HCT VFR BLD AUTO: 29.1 % (ref 35–47)
HGB BLD-MCNC: 10.1 G/DL (ref 11.7–15.7)
IMM GRANULOCYTES # BLD: 0.1 10E9/L (ref 0–0.4)
IMM GRANULOCYTES NFR BLD: 1 %
LYMPHOCYTES # BLD AUTO: 1.4 10E9/L (ref 0.8–5.3)
LYMPHOCYTES NFR BLD AUTO: 26.8 %
MCH RBC QN AUTO: 33.6 PG (ref 26.5–33)
MCHC RBC AUTO-ENTMCNC: 34.7 G/DL (ref 31.5–36.5)
MCV RBC AUTO: 97 FL (ref 78–100)
MONOCYTES # BLD AUTO: 0.5 10E9/L (ref 0–1.3)
MONOCYTES NFR BLD AUTO: 10.5 %
NEUTROPHILS # BLD AUTO: 3.1 10E9/L (ref 1.6–8.3)
NEUTROPHILS NFR BLD AUTO: 60.1 %
PLATELET # BLD AUTO: 134 10E9/L (ref 150–450)
POTASSIUM SERPL-SCNC: 3.5 MMOL/L (ref 3.5–5.1)
RBC # BLD AUTO: 3.01 10E12/L (ref 3.8–5.2)
SODIUM SERPL-SCNC: 142 MMOL/L (ref 134–144)
SPECIMEN SOURCE: NORMAL
WBC # BLD AUTO: 5.2 10E9/L (ref 4–11)

## 2018-08-21 PROCEDURE — 85025 COMPLETE CBC W/AUTO DIFF WBC: CPT | Performed by: INTERNAL MEDICINE

## 2018-08-21 PROCEDURE — 25000132 ZZH RX MED GY IP 250 OP 250 PS 637: Performed by: INTERNAL MEDICINE

## 2018-08-21 PROCEDURE — 99238 HOSP IP/OBS DSCHRG MGMT 30/<: CPT | Performed by: INTERNAL MEDICINE

## 2018-08-21 PROCEDURE — 80048 BASIC METABOLIC PNL TOTAL CA: CPT | Performed by: INTERNAL MEDICINE

## 2018-08-21 PROCEDURE — 36415 COLL VENOUS BLD VENIPUNCTURE: CPT | Performed by: INTERNAL MEDICINE

## 2018-08-21 PROCEDURE — 25000128 H RX IP 250 OP 636: Performed by: INTERNAL MEDICINE

## 2018-08-21 RX ADMIN — LACTOBACILLUS ACIDOPH-L.BULGARICUS 1 MILLION CELL CHEWABLE TABLET 1 TABLET: at 08:17

## 2018-08-21 RX ADMIN — LACTOBACILLUS ACIDOPH-L.BULGARICUS 1 MILLION CELL CHEWABLE TABLET 1 TABLET: at 11:23

## 2018-08-21 RX ADMIN — MULTIPLE VITAMINS W/ MINERALS TAB 1 TABLET: TAB at 11:11

## 2018-08-21 RX ADMIN — CEFEPIME HYDROCHLORIDE 2 G: 2 INJECTION, POWDER, FOR SOLUTION INTRAVENOUS at 05:32

## 2018-08-21 RX ADMIN — EXEMESTANE 25 MG: 25 TABLET ORAL at 10:00

## 2018-08-21 RX ADMIN — VITAMIN D 5000 UNITS: 25 TAB ORAL at 11:11

## 2018-08-21 ASSESSMENT — ACTIVITIES OF DAILY LIVING (ADL)
ADLS_ACUITY_SCORE: 9

## 2018-08-21 NOTE — PHARMACY - DISCHARGE MEDICATION RECONCILIATION
Pharmacy:  Discharge Counseling and Medication Reconciliation    Barb Valdez  [address]  [place] [address]  [phone] (home)   63 year old female  PCP: No Ref-Primary, Physician    Allergies: Review of patient's allergies indicates no known allergies.    Discharge Counseling:    Pharmacist met with patient (and/or family) today to review the medication portion of the After Visit Summary (with an emphasis on NEW medications) and to address patient's questions/concerns.      Discharge Medication Reconciliation:    Baylee Almaguer RPH has reviewed the patient's discharge medication orders and has compared them to the inpatient medication administration record and to what the patient was taking prior to admission - any discrepancies have been resolved.    It has been determined that the patient has an adequate supply of medications available or which can be obtained from the patient's preferred pharmacy.    Thank you for the consult.    Baylee Almaguer RPH........August 21, 2018 10:28 AM

## 2018-08-21 NOTE — DISCHARGE SUMMARY
[facility]    Discharge Summary  Hospitalist    Date of Admission:  8/18/2018  Date of Discharge:  8/21/2018  Discharging Provider: Sander Wren  Date of Service (when I saw the patient): 08/21/18    Discharge Diagnoses   Febrile neutropenia due to viral gastroenteritis.     History of Present Illness   Barb Valdez is an 63 year old female who presented with fever and diarrhea.  Pls see H&P for details.     Hospital Course   Barb Valdez was admitted on 8/18/2018.  The following problems were addressed during her hospitalization:    1. Febrile neutropenia-  on admission. After a single dose of neupogen WBC >5K with ANC>3K.. IV Cefepime x 3 days. Source likely viral GE. She has some abdominal cramping with loose watery stools. No more diarrhea for >24 hours. C.diff negative. Stool cultures pending but I think it' safe to DC her- our lab with forward the final result to the patient's oncologist in [place]. No pain. Chronic dry cough- unchanged. Blood cultures have been negative for 72 hours. Given one dose of Neupogen in AM of 8/19.  ANC >500 x 2 days and no more antibiotics indicated. Completely afebrile for >24 hours with fever curve declining since admission.   2. Metastatic breast CA- Follow up in [place]. Continue hormonal therapy. Requested pt to f/u with her oncologist on Friday 8/24. She's flying back on Thursday. She feels like she's back to her baseline with good oral intake and ambulatory.   3. Very mild upper back maculo-papular rash. It was initially itchy. This has improved. This seems to be a heat rash. The pt has been using a lot of blankets and lies on her back to sleep and her back is soaked in the morning even when she's been afebrile. Skin is somewhat dry as well. No cellulitis or folliculitis. Not concerning.  On exam scattered macular lesions with a few scabs where she's scratched herself. Skin is dry as well. Already getting better. Appears to be  a minor heat rash- use hydrating lotion. Other possibility is an allergic reaction to Cefepime but that appears unlikely. Already improving.   4. Viral GE- Resolved. Started having formed stools 24 hours ago.   5. Chronic dry cough- More or less at her baseline. She describes an allergic rhinitis with post-nasal drip at times though none are prominent at this time and didn't wish to try an anti-histamine.     [facility]    Active Problems:    Febrile illness, acute      Sander Wren      Pending Results   These results will be followed up by oncologist in [place]  Unresulted Labs Ordered in the Past 30 Days of this Admission     Date and Time Order Name Status Description    8/19/2018 1035 Stool culture SSCE Preliminary     8/18/2018 2020 Blood culture Preliminary     8/18/2018 2020 Blood culture Preliminary           Code Status   Full Code       Primary Care Physician   Physician No Ref-Primary        Discharge Disposition   Discharged to home  Condition at discharge: Stable    Consultations This Hospital Stay   None    Time Spent on this Encounter   ISander, personally saw the patient today and spent less than or equal to 30 minutes discharging this patient.    Discharge Orders     Reason for your hospital stay   Fever with low neutrophils (white cells) in your blood.     Follow-up and recommended labs and tests    Follow up with your oncologist in [place] on 8/24/2018  for hospital follow- up. The following labs/tests are recommended: CBC with diff. Follow up of stool culture done in-house.     Activity   Your activity upon discharge: activity as tolerated     Diet   Follow this diet upon discharge: Orders Placed This Encounter     Regular Diet Adult       Discharge Medications   Current Discharge Medication List      CONTINUE these medications which have NOT CHANGED    Details   exemestane (AROMASIN) 25 MG tablet Take 25 mg by mouth daily      multivitamin, therapeutic with minerals  (MULTI-VITAMIN) TABS tablet Take 1 tablet by mouth daily      Probiotic Product (PROBIOTIC ADVANCED PO) Take 1 capsule by mouth daily Nature's Garden      Vitamin D, Cholecalciferol, 1000 units TABS Take 5,000 Units by mouth daily         STOP taking these medications       palbociclib (IBRANCE) 100 MG capsule CHEMO Comments:   Reason for Stopping:             Allergies   No Known Allergies  Data   Most Recent 3 CBC's:  Recent Labs   Lab Test  08/21/18   0400  08/20/18   0825  08/19/18   0545   WBC  5.2  5.3  1.6*   HGB  10.1*  11.7  11.7   MCV  97  98  96   PLT  134*  133*  114*      Most Recent 3 BMP's:  Recent Labs   Lab Test  08/21/18   0400  08/19/18   0545  08/18/18 2021   NA  142  136  134   POTASSIUM  3.5  3.6  3.5   CHLORIDE  109*  107  101   CO2  26  24  26   BUN  8  7  7   CR  0.49*  0.58*  0.61   ANIONGAP  7  5  7   SUSANA  8.5*  8.7  8.9   GLC  86  88  98     Most Recent 2 LFT's:  Recent Labs   Lab Test  08/18/18 2021   AST  39   ALT  24   ALKPHOS  87   BILITOTAL  0.4     Most Recent INR's and Anticoagulation Dosing History:  Anticoagulation Dose History     There is no flowsheet data to display.        Most Recent 3 Troponin's:No lab results found.  Most Recent Cholesterol Panel:No lab results found.  Most Recent 6 Bacteria Isolates From Any Culture (See EPIC Reports for Culture Details):  Recent Labs   Lab Test  08/19/18   1035  08/18/18 2021   CULT  PENDING  No growth after 3 days  No growth after 3 days     Most Recent TSH, T4 and A1c Labs:No lab results found.  Results for orders placed or performed during the hospital encounter of 08/18/18   XR Chest 2 Views    Narrative    PROCEDURE:  XR CHEST 2 VW    HISTORY:  Fever; .     COMPARISON:  None.    FINDINGS:   The cardiac silhouette is normal in size. The pulmonary vasculature is  normal.  There is some increased density at the left lung base most  likely representing compressive atelectasis. There is a moderate size  left-sided pleural  effusion. Widespread osteoblastic metastatic  disease is seen throughout the regional skeleton      Impression    IMPRESSION:    1. Widespread osteoblastic metastatic disease.  2. Left-sided pleural effusion.  3. Probable compressive atelectasis left lung base      SIL CHAPARRO MD     Most Recent 3 CBC's:  Recent Labs   Lab Test  08/21/18   0400  08/20/18   0825  08/19/18   0545   WBC  5.2  5.3  1.6*   HGB  10.1*  11.7  11.7   MCV  97  98  96   PLT  134*  133*  114*     UA unremarkable. Blood cultures neg x 72 hours.

## 2018-08-21 NOTE — PROGRESS NOTES
Gave discharge instructions to patient and her . Answered questions. Patient will come back tomorrow to  ROSA info.  Discharge at 1345.

## 2018-08-21 NOTE — PLAN OF CARE
Problem: Patient Care Overview  Goal: Plan of Care/Patient Progress Review  Outcome: Adequate for Discharge Date Met: 08/21/18  Discharge order in place. Pt will be discharging to [facility]\"" then flying to [place] tomorrow. Vitals stable. Pt verbalized readiness to go. Signed ROSA to have information sent to oncologist, primary MD and for herself. Her stool culture is pending, spoke with Dima in lab who will ensure results are sent to her doctor. Pending fax response for hospital discharge summary, sent to Dr. Laurie Murphy (Primary MD at [phone]) and Dr. Alma Dick (Oncolohist [phone]) from [facility].

## 2018-08-24 LAB
BACTERIA SPEC CULT: NORMAL
BACTERIA SPEC CULT: NORMAL
SPECIMEN SOURCE: NORMAL
SPECIMEN SOURCE: NORMAL

## (undated) RX ORDER — CEFEPIME HYDROCHLORIDE 2 G/1
INJECTION, POWDER, FOR SOLUTION INTRAVENOUS
Status: DISPENSED
Start: 2018-08-18

## (undated) RX ORDER — CEFEPIME HYDROCHLORIDE 2 G/1
INJECTION, POWDER, FOR SOLUTION INTRAVENOUS
Status: DISPENSED
Start: 2018-08-19

## (undated) RX ORDER — SODIUM CHLORIDE, SODIUM LACTATE, POTASSIUM CHLORIDE, CALCIUM CHLORIDE 600; 310; 30; 20 MG/100ML; MG/100ML; MG/100ML; MG/100ML
INJECTION, SOLUTION INTRAVENOUS
Status: DISPENSED
Start: 2018-08-18